# Patient Record
Sex: MALE | Race: WHITE | NOT HISPANIC OR LATINO | Employment: FULL TIME | URBAN - NONMETROPOLITAN AREA
[De-identification: names, ages, dates, MRNs, and addresses within clinical notes are randomized per-mention and may not be internally consistent; named-entity substitution may affect disease eponyms.]

---

## 2024-05-10 ENCOUNTER — TELEPHONE (OUTPATIENT)
Dept: FAMILY MEDICINE CLINIC | Facility: CLINIC | Age: 34
End: 2024-05-10

## 2024-06-13 ENCOUNTER — OFFICE VISIT (OUTPATIENT)
Dept: FAMILY MEDICINE CLINIC | Facility: CLINIC | Age: 34
End: 2024-06-13
Payer: COMMERCIAL

## 2024-06-13 VITALS
OXYGEN SATURATION: 97 % | HEIGHT: 65 IN | SYSTOLIC BLOOD PRESSURE: 112 MMHG | HEART RATE: 85 BPM | WEIGHT: 308.9 LBS | BODY MASS INDEX: 51.46 KG/M2 | DIASTOLIC BLOOD PRESSURE: 86 MMHG

## 2024-06-13 DIAGNOSIS — R29.818 SUSPECTED SLEEP APNEA: ICD-10-CM

## 2024-06-13 DIAGNOSIS — E66.01 MORBID (SEVERE) OBESITY DUE TO EXCESS CALORIES: ICD-10-CM

## 2024-06-13 DIAGNOSIS — Z00.00 ENCOUNTER FOR WELLNESS EXAMINATION IN ADULT: Primary | ICD-10-CM

## 2024-06-13 PROCEDURE — 36415 COLL VENOUS BLD VENIPUNCTURE: CPT | Performed by: FAMILY MEDICINE

## 2024-06-13 PROCEDURE — 99214 OFFICE O/P EST MOD 30 MIN: CPT | Performed by: FAMILY MEDICINE

## 2024-06-13 PROCEDURE — 99385 PREV VISIT NEW AGE 18-39: CPT | Performed by: FAMILY MEDICINE

## 2024-06-13 NOTE — ASSESSMENT & PLAN NOTE
Patient's (Body mass index is 51.4 kg/m².) indicates that they are morbidly/severely obese (BMI > 40 or > 35 with obesity - related health condition) with health conditions that include obstructive sleep apnea . Weight is newly identified. BMI  is above average; BMI management plan is completed. We discussed portion control, increasing exercise, and we discussed some options for medical weight loss treatment however patient does not think his insurance covers these medications for weight loss specifically.  Will await lab work to determine treatment. .

## 2024-06-13 NOTE — PROGRESS NOTES
Patient Name: Jarad Mims  : 1990   MRN: [id number]     Chief Complaint:    Chief Complaint   Patient presents with    Obesity     Discuss weight loss medications     Insomnia     Patient reports he can go to sleep, he can't stay asleep     Snoring       History of Present Illness: Jarad Mims is a 34 y.o. male who is here today for their annual health maintenance and physical.  He has no chronic medical conditions, family history consists of hypertension.      He would like to discuss referral for a sleep study as his wife reports she has witnessed apneic episodes and the patient snores very loudly along with frequent nighttime wakings.  He wakes feeling tired and can fall asleep very easily throughout the day.    He would also like to discuss options for weight loss treatment.  He has tried diet and exercise on his own and has been unsuccessful.  He reports his mom side of the family struggles with obesity.         Review of Systems:   Review of Systems   Constitutional:  Positive for fatigue and unexpected weight gain.   Eyes:  Negative for blurred vision.   Respiratory:  Positive for snoring. Negative for cough, chest tightness and shortness of breath.    Cardiovascular:  Negative for chest pain, palpitations and leg swelling.   Gastrointestinal:  Negative for abdominal pain, constipation, diarrhea, nausea and vomiting.   Neurological:  Negative for dizziness, tremors and headache.   Psychiatric/Behavioral:  Positive for sleep disturbance. Negative for depressed mood. The patient has insomnia. The patient is not nervous/anxious.        Past Medical History, Social History, Family History and Care Team were all reviewed with patient and updated as appropriate.     Medications:   No current outpatient medications on file.    Allergies:   No Known Allergies        PHQ-2 Total Score: 0   PHQ-9 Total Score: 0      Intimate partner violence: (Screen on initial visit, older adult with injury or  "evidence of neglect):  Violence can be a problem in many people's lives, so I now ask every patient about trauma or abuse they may have experienced in a relationship.  Stress/Safety - Do you feel safe in your relationship?  Afraid/Abused - Have you ever been in a relationship where you were threatened, hurt, or afraid?  Friend/Family - Are your friends aware you have been hurt?  Emergency Plan - Do you have a safe place to go and the resources you need in an emergency?    Osteoporosis:   Men: history of low trauma fracture, androgen deprivation therapy for prostate cancer, hypogonadism, primary hyperparathyroidism, intestinal disorders.       Physical Exam:  Vital Signs:   Vitals:    06/13/24 1311   BP: 112/86   BP Location: Right arm   Patient Position: Sitting   Cuff Size: Large Adult   Pulse: 85   SpO2: 97%   Weight: (!) 140 kg (308 lb 14.4 oz)   Height: 165.1 cm (65\")     Body mass index is 51.4 kg/m².     Physical Exam  Vitals and nursing note reviewed.   Constitutional:       Appearance: Normal appearance. He is obese.   HENT:      Right Ear: Tympanic membrane and ear canal normal.      Left Ear: Tympanic membrane and ear canal normal.   Eyes:      Conjunctiva/sclera: Conjunctivae normal.      Pupils: Pupils are equal, round, and reactive to light.   Cardiovascular:      Rate and Rhythm: Normal rate and regular rhythm.      Heart sounds: Normal heart sounds.   Pulmonary:      Effort: Pulmonary effort is normal.      Breath sounds: Normal breath sounds.   Abdominal:      General: Bowel sounds are normal.      Palpations: Abdomen is soft.      Tenderness: There is no abdominal tenderness.   Musculoskeletal:      Cervical back: Neck supple.   Lymphadenopathy:      Cervical: No cervical adenopathy.   Neurological:      General: No focal deficit present.      Mental Status: He is alert and oriented to person, place, and time.   Psychiatric:         Mood and Affect: Mood normal.         Behavior: Behavior normal. "         Procedures      Assessment/Plan:   Diagnoses and all orders for this visit:    1. Encounter for wellness examination in adult (Primary)  Assessment & Plan:  Labs today, up-to-date on health maintenance    Orders:  -     Hemoglobin A1c; Future  -     CBC Auto Differential; Future  -     Comprehensive Metabolic Panel; Future  -     Lipid Panel; Future  -     TSH; Future    2. Suspected sleep apnea  Assessment & Plan:  Referred to ENT for in-home sleep study    Orders:  -     Ambulatory Referral to ENT (Otolaryngology)    3. Morbid (severe) obesity due to excess calories  Assessment & Plan:  Patient's (Body mass index is 51.4 kg/m².) indicates that they are morbidly/severely obese (BMI > 40 or > 35 with obesity - related health condition) with health conditions that include obstructive sleep apnea . Weight is newly identified. BMI  is above average; BMI management plan is completed. We discussed portion control, increasing exercise, and we discussed some options for medical weight loss treatment however patient does not think his insurance covers these medications for weight loss specifically.  Will await lab work to determine treatment. .              Follow Up:   Return in about 3 months (around 9/13/2024) for Weight Check.    Healthcare Maintenance:   Counseling provided on Discussed injury prevention, diet and exercise, safe sexual practices, and screening for common diseases. Encouraged use of sunscreen and seatbelts. Discussed timing of colon cancer cancer screening, prostate cancer screening, and review of skin for lesions. Avoidance of tobacco encouraged. Limitation or avoidance of alcohol encouraged. Recommend yearly dental and eye exams. Also discussed monitoring of blood pressure and lipids.     Jarad Mims voices understanding and acceptance of this advice and will call back with any further questions or concerns. AVS with preventive healthcare tips printed for patient.     Brittany Jara  DO  [facility]

## 2024-06-14 LAB
ALBUMIN SERPL-MCNC: 4.4 G/DL (ref 4.1–5.1)
ALBUMIN/GLOB SERPL: 1.8 {RATIO}
ALP SERPL-CCNC: 65 IU/L (ref 44–121)
ALT SERPL-CCNC: 77 IU/L (ref 0–44)
AST SERPL-CCNC: 40 IU/L (ref 0–40)
BASOPHILS # BLD AUTO: 0 X10E3/UL (ref 0–0.2)
BASOPHILS NFR BLD AUTO: 1 %
BILIRUB SERPL-MCNC: 0.4 MG/DL (ref 0–1.2)
BUN SERPL-MCNC: 16 MG/DL (ref 6–20)
BUN/CREAT SERPL: 16 (ref 9–20)
CALCIUM SERPL-MCNC: 9.4 MG/DL (ref 8.7–10.2)
CHLORIDE SERPL-SCNC: 105 MMOL/L (ref 96–106)
CHOLEST SERPL-MCNC: 229 MG/DL (ref 100–199)
CO2 SERPL-SCNC: 21 MMOL/L (ref 20–29)
CREAT SERPL-MCNC: 0.99 MG/DL (ref 0.76–1.27)
EGFRCR SERPLBLD CKD-EPI 2021: 104 ML/MIN/1.73
EOSINOPHIL # BLD AUTO: 0.1 X10E3/UL (ref 0–0.4)
EOSINOPHIL NFR BLD AUTO: 1 %
ERYTHROCYTE [DISTWIDTH] IN BLOOD BY AUTOMATED COUNT: 14 % (ref 11.6–15.4)
GLOBULIN SER CALC-MCNC: 2.4 G/DL (ref 1.5–4.5)
GLUCOSE SERPL-MCNC: 108 MG/DL (ref 70–99)
HBA1C MFR BLD: 5.6 % (ref 4.8–5.6)
HCT VFR BLD AUTO: 49.5 % (ref 37.5–51)
HDLC SERPL-MCNC: 38 MG/DL
HGB BLD-MCNC: 16.4 G/DL (ref 13–17.7)
IMM GRANULOCYTES # BLD AUTO: 0.1 X10E3/UL (ref 0–0.1)
IMM GRANULOCYTES NFR BLD AUTO: 1 %
LDLC SERPL CALC-MCNC: 134 MG/DL (ref 0–99)
LYMPHOCYTES # BLD AUTO: 2.2 X10E3/UL (ref 0.7–3.1)
LYMPHOCYTES NFR BLD AUTO: 32 %
MCH RBC QN AUTO: 28.1 PG (ref 26.6–33)
MCHC RBC AUTO-ENTMCNC: 33.1 G/DL (ref 31.5–35.7)
MCV RBC AUTO: 85 FL (ref 79–97)
MONOCYTES # BLD AUTO: 0.5 X10E3/UL (ref 0.1–0.9)
MONOCYTES NFR BLD AUTO: 7 %
NEUTROPHILS # BLD AUTO: 3.8 X10E3/UL (ref 1.4–7)
NEUTROPHILS NFR BLD AUTO: 58 %
PLATELET # BLD AUTO: 206 X10E3/UL (ref 150–450)
POTASSIUM SERPL-SCNC: 4.2 MMOL/L (ref 3.5–5.2)
PROT SERPL-MCNC: 6.8 G/DL (ref 6–8.5)
RBC # BLD AUTO: 5.84 X10E6/UL (ref 4.14–5.8)
SODIUM SERPL-SCNC: 140 MMOL/L (ref 134–144)
TRIGL SERPL-MCNC: 317 MG/DL (ref 0–149)
TSH SERPL DL<=0.005 MIU/L-ACNC: 1.51 UIU/ML (ref 0.45–4.5)
VLDLC SERPL CALC-MCNC: 57 MG/DL (ref 5–40)
WBC # BLD AUTO: 6.6 X10E3/UL (ref 3.4–10.8)

## 2024-08-07 ENCOUNTER — OFFICE VISIT (OUTPATIENT)
Dept: FAMILY MEDICINE CLINIC | Facility: CLINIC | Age: 34
End: 2024-08-07
Payer: COMMERCIAL

## 2024-08-07 VITALS
WEIGHT: 301.3 LBS | SYSTOLIC BLOOD PRESSURE: 122 MMHG | DIASTOLIC BLOOD PRESSURE: 76 MMHG | HEIGHT: 66 IN | OXYGEN SATURATION: 98 % | HEART RATE: 76 BPM | BODY MASS INDEX: 48.42 KG/M2

## 2024-08-07 DIAGNOSIS — N45.1 EPIDIDYMITIS, RIGHT: Primary | ICD-10-CM

## 2024-08-07 PROCEDURE — 99213 OFFICE O/P EST LOW 20 MIN: CPT | Performed by: FAMILY MEDICINE

## 2024-08-07 RX ORDER — LEVOFLOXACIN 500 MG/1
500 TABLET, FILM COATED ORAL DAILY
Qty: 10 TABLET | Refills: 0 | Status: SHIPPED | OUTPATIENT
Start: 2024-08-07

## 2024-08-07 NOTE — PROGRESS NOTES
"     Follow Up Office Visit      Patient Name: Jarad Mims  : 1990   MRN: [id number]     Chief Complaint:    Chief Complaint   Patient presents with    Testicle Pain     Right testicle is painful and tender, no swelling no redness, been hurting since  night, no issues with urination.       History of Present Illness: Jarad Mims is a 34 y.o. male who is here today for follow up with R testicular pain for the past 3-4 days. No injury and no risk for STI. Pain is moderate and uncomfortable.    Subjective      Review of Systems:   Review of Systems   Constitutional:  Negative for fever.   Genitourinary:  Positive for testicular pain. Negative for dysuria, flank pain, frequency, penile swelling and scrotal swelling.       The following portions of the patient's history were reviewed and updated as appropriate: allergies, current medications, past family history, past medical history, past social history, past surgical history and problem list.    Medications:     Current Outpatient Medications:     levoFLOXacin (Levaquin) 500 MG tablet, Take 1 tablet by mouth Daily., Disp: 10 tablet, Rfl: 0    Allergies:   No Known Allergies    Objective     Physical Exam:  Vital Signs:   Vitals:    24 1206   BP: 122/76   Pulse: 76   SpO2: 98%   Weight: (!) 137 kg (301 lb 4.8 oz)   Height: 167.6 cm (66\")     Body mass index is 48.63 kg/m².   Facility age limit for growth %sandra is 20 years.    Physical Exam  Vitals and nursing note reviewed.   Constitutional:       General: He is not in acute distress.     Appearance: Normal appearance. He is not ill-appearing or toxic-appearing.   Genitourinary:     Testes:         Right: Tenderness present. Swelling not present.      Epididymis:      Right: Tenderness present.   Neurological:      Mental Status: He is alert.         Procedures    PHQ-9 Total Score:       Assessment / Plan      Assessment/Plan:   Assessment & Plan  Epididymitis, right  Recommend if symptoms " worsen, go to ER immediately for more extensive evaluation.     New Medications Ordered This Visit   Medications    levoFLOXacin (Levaquin) 500 MG tablet     Sig: Take 1 tablet by mouth Daily.     Dispense:  10 tablet     Refill:  0                   Follow Up:   No follow-ups on file.      ISAIAS Medrano MD  [facility]  Answers submitted by the patient for this visit:  Primary Reason for Visit (Submitted on 8/6/2024)  What is the primary reason for your visit?: Other  Other (Submitted on 8/6/2024)  Please describe your symptoms.: Right testicle is hurting. Not unbearable swollen or any discolor but very uncomfortable.  Have you had these symptoms before?: No  How long have you been having these symptoms?: 1-4 days  Please describe any probable cause for these symptoms. 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

## 2025-01-08 ENCOUNTER — OFFICE VISIT (OUTPATIENT)
Dept: FAMILY MEDICINE CLINIC | Facility: CLINIC | Age: 35
End: 2025-01-08
Payer: COMMERCIAL

## 2025-01-08 VITALS
BODY MASS INDEX: 50.46 KG/M2 | OXYGEN SATURATION: 98 % | HEART RATE: 90 BPM | WEIGHT: 314 LBS | DIASTOLIC BLOOD PRESSURE: 92 MMHG | HEIGHT: 66 IN | SYSTOLIC BLOOD PRESSURE: 146 MMHG

## 2025-01-08 DIAGNOSIS — E66.01 MORBID (SEVERE) OBESITY DUE TO EXCESS CALORIES: Primary | ICD-10-CM

## 2025-01-08 DIAGNOSIS — R03.0 ELEVATED BLOOD PRESSURE READING WITHOUT DIAGNOSIS OF HYPERTENSION: ICD-10-CM

## 2025-01-08 PROCEDURE — 99213 OFFICE O/P EST LOW 20 MIN: CPT

## 2025-01-08 NOTE — ASSESSMENT & PLAN NOTE
Patient's (Body mass index is 50.68 kg/m².) indicates that they are morbidly/severely obese (BMI > 40 or > 35 with obesity - related health condition) with health conditions that include none . Weight is unchanged. BMI  is above average; BMI management plan is completed. We discussed portion control, increasing exercise, and pharmacologic options including Zepbound.       Discussed with patient we will update his CMP as his ALT on 6/13/2024 was mildly elevated at 77.  He is asymptomatic.  Discussed we will do Zepbound 2.5 mg weekly.  Instructed patient to contact office around week 3 if he is tolerating medication well to see if he can have a dose increase.  We discussed side effects of medication which may include nausea, vomiting, abdominal pain, constipation or diarrhea.  Recommend that if these persistently occur, he stop medication and notify the office.  Discussed that if his liver enzymes remain elevated, we will discontinue medication.  Recommend he continue his lifestyle modifications with his diet and exercise.  We did discuss referring to weight management.  Patient politely declined at this time.  Orders:    Tirzepatide-Weight Management (ZEPBOUND) 2.5 MG/0.5ML solution auto-injector; Inject 0.5 mL under the skin into the appropriate area as directed 1 (One) Time Per Week for 4 doses.    Comprehensive metabolic panel

## 2025-01-08 NOTE — PROGRESS NOTES
"Chief Complaint  Weight Loss    Subjective          Jarad Mims presents to [facility]    Onset was at an unknown time.   Pertinent negative symptoms include no abdominal pain, no anorexia, no joint pain, no change in stool, no chest pain, no chills, no congestion, no cough, no diaphoresis, no fatigue, no fever, no headaches, no joint swelling, no myalgias, no nausea, no neck pain, no numbness, no rash, no sore throat, no swollen glands, no dysuria, no vertigo, no visual change, no vomiting and no weakness.   Treatment and/or Medications comments include: N/A   Additional information: Look to get the weightless shot. Tried to get approved back in the summer and my insurance didnt cover it. I have switched to my wives insurance and she got approved during the summer    Patient presents to office today to discuss options for weight loss treatment.  He was evaluated for this in June 2024 by his PCP.  He had tried diet and exercise at that time and had been unsuccessful.  His wife is currently on Zepbound and he would like to try this as his insurance recently changed.  He states that his goal weight loss is 230 pounds.  He ideally would like to get 200 pounds and build muscle.  He is wanting a boost to help him with his weight loss.  He has tried dieting, exercise, keto diet.  He lost 60 pounds on the keto diet and gained it back.  He is doing well with meal prepping and portion control.  He is going to the gym.  Denies any difficulties with abdominal pain, nausea, vomiting, constipation.  His liver enzymes were mildly elevated in June 2024.  He does occasionally drink alcohol socially but not regularly.  He is unsure if he had a drink prior to his labs last time.     Objective   Vital Signs:   /92 (BP Location: Right arm, Patient Position: Sitting, Cuff Size: Adult)   Pulse 90   Ht 167.6 cm (66\")   Wt (!) 142 kg (314 lb)   SpO2 98%   BMI 50.68 kg/m²     Body mass index is " 50.68 kg/m².    Review of Systems   Constitutional:  Negative for chills, diaphoresis, fatigue and fever.   HENT:  Negative for congestion, sore throat and swollen glands.    Respiratory:  Negative for cough.    Cardiovascular:  Negative for chest pain.   Gastrointestinal:  Negative for abdominal pain, anorexia, constipation, diarrhea, nausea and vomiting.   Genitourinary:  Negative for dysuria.   Musculoskeletal:  Negative for joint pain, myalgias and neck pain.   Skin:  Negative for rash.   Neurological:  Negative for vertigo, weakness and numbness.       Past History:  Medical History: has a past medical history of Obesity.   Surgical History: has a past surgical history that includes Tonsillectomy.   Family History: family history includes Anxiety disorder in his father; Hypertension in his mother.   Social History: reports that he has never smoked. He has never used smokeless tobacco. He reports that he does not currently use alcohol. He reports that he does not use drugs.      Current Outpatient Medications:     Tirzepatide-Weight Management (ZEPBOUND) 2.5 MG/0.5ML solution auto-injector, Inject 0.5 mL under the skin into the appropriate area as directed 1 (One) Time Per Week for 4 doses., Disp: 2 mL, Rfl: 0    Allergies: Patient has no known allergies.    Physical Exam  Constitutional:       General: He is not in acute distress.     Appearance: He is obese. He is not ill-appearing or toxic-appearing.   HENT:      Head: Normocephalic and atraumatic.   Pulmonary:      Effort: Pulmonary effort is normal. No respiratory distress.   Skin:     General: Skin is warm.   Neurological:      General: No focal deficit present.      Mental Status: He is alert and oriented to person, place, and time. Mental status is at baseline.   Psychiatric:         Mood and Affect: Mood normal.         Behavior: Behavior normal.         Thought Content: Thought content normal.         Judgment: Judgment normal.          Result Review :                    Assessment and Plan    Assessment & Plan  Morbid (severe) obesity due to excess calories  Patient's (Body mass index is 50.68 kg/m².) indicates that they are morbidly/severely obese (BMI > 40 or > 35 with obesity - related health condition) with health conditions that include none . Weight is unchanged. BMI  is above average; BMI management plan is completed. We discussed portion control, increasing exercise, and pharmacologic options including Zepbound .       Discussed with patient we will update his CMP as his ALT on 6/13/2024 was mildly elevated at 77.  He is asymptomatic.  Discussed we will do Zepbound 2.5 mg weekly.  Instructed patient to contact office around week 3 if he is tolerating medication well to see if he can have a dose increase.  We discussed side effects of medication which may include nausea, vomiting, abdominal pain, constipation or diarrhea.  Recommend that if these persistently occur, he stop medication and notify the office.  Discussed that if his liver enzymes remain elevated, we will discontinue medication.  Recommend he continue his lifestyle modifications with his diet and exercise.  We did discuss referring to weight management.  Patient politely declined at this time.  Orders:    Tirzepatide-Weight Management (ZEPBOUND) 2.5 MG/0.5ML solution auto-injector; Inject 0.5 mL under the skin into the appropriate area as directed 1 (One) Time Per Week for 4 doses.    Comprehensive metabolic panel    Elevated blood pressure reading without diagnosis of hypertension  Blood pressure elevated today 146/92.  Denies chest pain or shortness of breath.  Will update CMP today.  If blood pressure continues to remain elevated, recommend he follow-up with his PCP on this as he may need medication in the future.  Anticipate that this will likely go down with weight loss.  Orders:    Comprehensive metabolic panel    He has a follow-up with Dr. Medrano in April 2025 to reestablish care.   Patient is agreeable to plan and verbalized understanding of plan of care.    Follow Up   Return for Next scheduled follow up.  Patient was given instructions and counseling regarding his condition or for health maintenance advice. Please see specific information pulled into the AVS if appropriate.     Samara Michele, APRN

## 2025-01-09 ENCOUNTER — TELEPHONE (OUTPATIENT)
Dept: FAMILY MEDICINE CLINIC | Facility: CLINIC | Age: 35
End: 2025-01-09
Payer: COMMERCIAL

## 2025-01-09 DIAGNOSIS — R74.8 ELEVATED LIVER ENZYMES: Primary | ICD-10-CM

## 2025-01-09 LAB
ALBUMIN SERPL-MCNC: 4.6 G/DL (ref 4.1–5.1)
ALP SERPL-CCNC: 68 IU/L (ref 44–121)
ALT SERPL-CCNC: 89 IU/L (ref 0–44)
AST SERPL-CCNC: 47 IU/L (ref 0–40)
BILIRUB SERPL-MCNC: 0.4 MG/DL (ref 0–1.2)
BUN SERPL-MCNC: 17 MG/DL (ref 6–20)
BUN/CREAT SERPL: 18 (ref 9–20)
CALCIUM SERPL-MCNC: 9.7 MG/DL (ref 8.7–10.2)
CHLORIDE SERPL-SCNC: 105 MMOL/L (ref 96–106)
CO2 SERPL-SCNC: 22 MMOL/L (ref 20–29)
CREAT SERPL-MCNC: 0.94 MG/DL (ref 0.76–1.27)
EGFRCR SERPLBLD CKD-EPI 2021: 110 ML/MIN/1.73
GLOBULIN SER CALC-MCNC: 2 G/DL (ref 1.5–4.5)
GLUCOSE SERPL-MCNC: 120 MG/DL (ref 70–99)
POTASSIUM SERPL-SCNC: 4.3 MMOL/L (ref 3.5–5.2)
PROT SERPL-MCNC: 6.6 G/DL (ref 6–8.5)
SODIUM SERPL-SCNC: 141 MMOL/L (ref 134–144)

## 2025-01-09 NOTE — TELEPHONE ENCOUNTER
Called patient on 1/9/2025 to discuss blood work results. We discussed that liver enzymes remain elevated and typically this is contraindicated with starting Zepbound as this can potentially cause rare hepatotoxicity. We discussed several options such as rechecking his liver enzymes in 4-6 weeks and then re-evaluating starting Zepbound. We also discussed doing a liver ultrasound to evaluate for any abnormalities as well prior to starting injection. Potential side effects were discussed and risks with starting medication. He is aware of this and verbalized understanding. He states that he is hoping with the weight loss, this will decrease his liver enzymes.He is agreeable to returning to the office in 4-6 weeks to have repeat blood work drawn, however, he would like to go ahead and start the medication. Discussed that standing orders for a CMP would be placed. He states he would like to wait for a liver ultrasound at this time and will notify office if he is having any side effects such as abdominal pain, nausea, vomiting, constipation, injection site reactions, jaundice, fevers, decreased urinary output, chest pain or shortness of breath. Recommend low carbohydrate, high fiber diet. Recommend limiting fried fatty foods. Increase oral hydration with water to prevent dehydration and limit alcohol consumption. He has a follow up with a Dr. Medrano  in 4/2025. Discussed that if he would like to have an ultrasound of his liver done to notify this provider or to notify office. Patient is agreeable to this and verbalized understanding of plan of care.

## 2025-02-10 ENCOUNTER — TELEPHONE (OUTPATIENT)
Dept: FAMILY MEDICINE CLINIC | Facility: CLINIC | Age: 35
End: 2025-02-10
Payer: COMMERCIAL

## 2025-02-10 NOTE — TELEPHONE ENCOUNTER
Zepbound has been increased to 5 mg and was sent to [facility] in [place]. Please let me know if you have any questions.

## 2025-04-08 NOTE — TELEPHONE ENCOUNTER
Caller: Jarad Mims    Relationship: Self    Best call back number: [phone]    Requested Prescriptions:   Requested Prescriptions     Pending Prescriptions Disp Refills    Tirzepatide-Weight Management (ZEPBOUND) 5 MG/0.5ML solution auto-injector 2 mL 2     Sig: Inject 0.5 mL under the skin into the appropriate area as directed 1 (One) Time Per Week.        Pharmacy where request should be sent: [id number] [place] [address] [facility] [address] [facility] - [phone] [facility] [phone] FX     Last office visit with prescribing clinician: 8/7/2024   Last telemedicine visit with prescribing clinician: Visit date not found   Next office visit with prescribing clinician: 4/24/2025     Additional details provided by patient: PT TOOK LAST SHOT ON 04-06-25. PT IS DONE WITH 2 MONTHS ON 5.0 MG AND NEED TO MOVE UP TO 7.5 MG.    Does the patient have less than a 3 day supply:  [x] Yes  [] No    Would you like a call back once the refill request has been completed: [] Yes [x] No    If the office needs to give you a call back, can they leave a voicemail: [] Yes [x] No    Emory Salgado Rep   04/08/25 15:37 EDT

## 2025-04-23 ENCOUNTER — TELEPHONE (OUTPATIENT)
Dept: FAMILY MEDICINE CLINIC | Facility: CLINIC | Age: 35
End: 2025-04-23
Payer: COMMERCIAL

## 2025-04-24 ENCOUNTER — OFFICE VISIT (OUTPATIENT)
Dept: FAMILY MEDICINE CLINIC | Facility: CLINIC | Age: 35
End: 2025-04-24
Payer: COMMERCIAL

## 2025-04-24 VITALS
SYSTOLIC BLOOD PRESSURE: 134 MMHG | WEIGHT: 283.2 LBS | HEART RATE: 75 BPM | BODY MASS INDEX: 45.51 KG/M2 | HEIGHT: 66 IN | OXYGEN SATURATION: 98 % | DIASTOLIC BLOOD PRESSURE: 76 MMHG

## 2025-04-24 DIAGNOSIS — R03.0 ELEVATED BLOOD PRESSURE READING WITHOUT DIAGNOSIS OF HYPERTENSION: ICD-10-CM

## 2025-04-24 DIAGNOSIS — R74.8 ELEVATED LIVER ENZYMES: ICD-10-CM

## 2025-04-24 DIAGNOSIS — E78.2 MIXED HYPERLIPIDEMIA: ICD-10-CM

## 2025-04-24 DIAGNOSIS — E66.01 MORBID (SEVERE) OBESITY DUE TO EXCESS CALORIES: Primary | ICD-10-CM

## 2025-04-24 NOTE — ASSESSMENT & PLAN NOTE
Will increase tirzepatide to 10 mg.  Will follow-up next visit.  Plan to check labs at that time.  Orders:    Tirzepatide-Weight Management (ZEPBOUND) 10 MG/0.5ML solution auto-injector; Inject 0.5 mL under the skin into the appropriate area as directed 1 (One) Time Per Week.

## 2025-04-24 NOTE — PROGRESS NOTES
New Patient Office Visit      Patient Name: Jarad Mims  : 1990   MRN: [id number]     Chief Complaint:    Chief Complaint   Patient presents with    Establish Care     Pt presents to establish care with Dr. Medrano; former pt of Dr. Jara. Med hx includes obesity. Currently only on Zepbound and satisfied with his improvement on it.        History of Present Illness: Jarad Mims is a 34 y.o. male who is here today to follow-up on weight loss.  Patient continues to lose weight.  He has just started the 7.5 mg tirzepatide.  He anticipates going to the 10 mg next month.  He states he is feeling great and is very appreciative of the weight loss he has had on Zepbound.    Subjective      Review of Systems:   Review of Systems   All other systems reviewed and are negative.      Past Medical History:   Past Medical History:   Diagnosis Date    Obesity        Past Surgical History:   Past Surgical History:   Procedure Laterality Date    TONSILLECTOMY         Family History:   Family History   Problem Relation Age of Onset    Hypertension Mother     Anxiety disorder Father        Social History:   Social History     Socioeconomic History    Marital status:    Tobacco Use    Smoking status: Never    Smokeless tobacco: Never   Vaping Use    Vaping status: Former    Substances: Nicotine    Devices: Disposable   Substance and Sexual Activity    Alcohol use: Yes     Alcohol/week: 2.0 standard drinks of alcohol     Types: 2 Cans of beer per week     Comment: May have 2-3 beers in the weekends but not always    Drug use: Never    Sexual activity: Yes     Partners: Female     Birth control/protection: Vaginal insert contraception       Medications:     Current Outpatient Medications:     Tirzepatide-Weight Management (ZEPBOUND) 10 MG/0.5ML solution auto-injector, Inject 0.5 mL under the skin into the appropriate area as directed 1 (One) Time Per Week., Disp: 2 mL, Rfl: 1    Tirzepatide-Weight Management  "(ZEPBOUND) 7.5 MG/0.5ML solution auto-injector, Inject 0.5 mL under the skin into the appropriate area as directed 1 (One) Time Per Week., Disp: 2 mL, Rfl: 1    Allergies:   No Known Allergies    Objective     Physical Exam:  Vital Signs:   Vitals:    04/24/25 1024   BP: 134/76   BP Location: Left arm   Patient Position: Sitting   Cuff Size: Large Adult   Pulse: 75   SpO2: 98%   Weight: 128 kg (283 lb 3.2 oz)   Height: 167.6 cm (66\")   PainSc: 0-No pain     Body mass index is 45.71 kg/m².   Facility age limit for growth %sandra is 20 years.    Physical Exam  Vitals and nursing note reviewed.   Cardiovascular:      Rate and Rhythm: Normal rate and regular rhythm.   Pulmonary:      Effort: Pulmonary effort is normal.      Breath sounds: Normal breath sounds.         Procedures    PHQ-9 Total Score:      Assessment / Plan      Assessment/Plan:   Assessment & Plan  Morbid (severe) obesity due to excess calories  Will increase tirzepatide to 10 mg.  Will follow-up next visit.  Plan to check labs at that time.  Orders:    Tirzepatide-Weight Management (ZEPBOUND) 10 MG/0.5ML solution auto-injector; Inject 0.5 mL under the skin into the appropriate area as directed 1 (One) Time Per Week.    Elevated liver enzymes  Likely fatty liver.  Will recheck next visit.  Hopefully improved with weight loss.  Consider statin.       Elevated blood pressure reading without diagnosis of hypertension  Improved with weight loss.       Mixed hyperlipidemia  Will check fasting lipid profile next visit.  If remains elevated, consider statin given likely fatty liver.                       Follow Up:   Return in about 3 months (around 7/24/2025).      ISAIAS Medrano MD  [facility]  "

## 2025-06-04 ENCOUNTER — TELEPHONE (OUTPATIENT)
Dept: FAMILY MEDICINE CLINIC | Facility: CLINIC | Age: 35
End: 2025-06-04
Payer: COMMERCIAL

## 2025-06-04 DIAGNOSIS — E66.01 MORBID (SEVERE) OBESITY DUE TO EXCESS CALORIES: Primary | ICD-10-CM

## 2025-06-04 RX ORDER — SEMAGLUTIDE 0.5 MG/.5ML
0.5 INJECTION, SOLUTION SUBCUTANEOUS WEEKLY
Qty: 2 ML | Refills: 0 | Status: SHIPPED | OUTPATIENT
Start: 2025-06-04

## 2025-06-04 NOTE — TELEPHONE ENCOUNTER
Caller: Jarad Mims    Relationship: Self    Best call back number: [phone]     What is the best time to reach you: ANYTIME    Who are you requesting to speak with (clinical staff, provider,  specific staff member): CLINICAL STAFF    What was the call regarding: ZEPBOUND NO LONGER COVERED BY THE INSURANCE, THEY TOLD PATIENT TO GO TO [facility] OR [facility]. PATIENT WOULD LIKE A CALL BACK TO DISCUSS HIS OPTIONS

## 2025-06-09 ENCOUNTER — TELEPHONE (OUTPATIENT)
Dept: FAMILY MEDICINE CLINIC | Facility: CLINIC | Age: 35
End: 2025-06-09
Payer: COMMERCIAL

## 2025-06-09 NOTE — TELEPHONE ENCOUNTER
Caller: Jarad Mims    Relationship: Self    Best call back number: [phone]    Which medication are you concerned about:     ZEPBOUND    What are your concerns:     PATIENT PICKED UP ZEPBOUND ON A FRIDAY WHEN IT WAS COVERED BY INSURANCE.     THE [facility] IS AT THE PHARMACY WAITING ON HIM.  SHOULD HE GO AHEAD AND PICK IT UP?  HE HAS ZEPBOUND TO TAKE THE LAST DOSE ON 6/27.      PLEASE CALL PATIENT TO LET HIM KNOW WHAT TO DO

## 2025-07-10 ENCOUNTER — PATIENT MESSAGE (OUTPATIENT)
Dept: FAMILY MEDICINE CLINIC | Facility: CLINIC | Age: 35
End: 2025-07-10
Payer: COMMERCIAL

## 2025-07-10 RX ORDER — SEMAGLUTIDE 1 MG/.5ML
1 INJECTION, SOLUTION SUBCUTANEOUS WEEKLY
Qty: 2 ML | Refills: 2 | Status: SHIPPED | OUTPATIENT
Start: 2025-07-10

## 2025-07-24 ENCOUNTER — TELEPHONE (OUTPATIENT)
Dept: FAMILY MEDICINE CLINIC | Facility: CLINIC | Age: 35
End: 2025-07-24

## 2025-07-24 NOTE — TELEPHONE ENCOUNTER
CALLED PATIENT AND LEFT A VOICEMAIL TO GET APPT ON 07/24/2025 RESCHEDULED DUE TO PROVIDER BEING OUT OF OFFICE WENT AHEAD AND CANCELED BUT TOLD LULUT TO CALL BACK TO GET IT RESCHEDULED

## 2025-07-29 ENCOUNTER — PATIENT MESSAGE (OUTPATIENT)
Dept: FAMILY MEDICINE CLINIC | Facility: CLINIC | Age: 35
End: 2025-07-29
Payer: COMMERCIAL

## 2025-07-29 DIAGNOSIS — E66.01 MORBID (SEVERE) OBESITY DUE TO EXCESS CALORIES: Primary | ICD-10-CM

## 2025-07-30 RX ORDER — SEMAGLUTIDE 1.7 MG/.75ML
1.7 INJECTION, SOLUTION SUBCUTANEOUS WEEKLY
Qty: 6 ML | Refills: 2 | Status: SHIPPED | OUTPATIENT
Start: 2025-07-30